# Patient Record
Sex: FEMALE | Race: WHITE
[De-identification: names, ages, dates, MRNs, and addresses within clinical notes are randomized per-mention and may not be internally consistent; named-entity substitution may affect disease eponyms.]

---

## 2017-09-01 NOTE — EDM.PDOC
ED HPI GENERAL MEDICAL PROBLEM





- General


Chief Complaint: Syncope


Stated Complaint: DIZZINESS


Time Seen by Provider: 09/01/17 14:07


Source of Information: Reports: Patient


History Limitations: Reports: No Limitations





- History of Present Illness


INITIAL COMMENTS - FREE TEXT/NARRATIVE: 





Patient describes dizziness and near syncope for several weeks.  She has been 

getting treatment from her chiropractic doctor who did finally say that this 

was not neck related and she should be seen for this.  She states over the last 

1-2 weeks it has become worse.  No actual syncopal episodes, describes it as 

dizziness.  Does have nausea.  No vomiting.  No other complaints.  No history 

of MI, CVA.  History of HTN, Diabetes.  Is an occasional smoker.  States she can

't smoke at home, so will sometimes when out.  Drinks alcohol rarely, no drug 

use.


Onset: Gradual


Duration: Getting Worse


Location: Reports: Head


Worsens with: Reports: Movement


Associated Symptoms: Reports: Nausea/Vomiting





- Related Data


 Allergies











Allergy/AdvReac Type Severity Reaction Status Date / Time


 


erythromycin base Allergy  Respiratory Verified 09/01/17 14:21





   Distress  


 


Penicillins Allergy  Respiratory Verified 09/01/17 14:21





   Distress  











Home Meds: 


 Home Meds





Insulin Glarg,Human.Rec.Analog [LantUS] 15 unit SQ BID 03/17/15 [History]


metFORMIN [Glucophage] 1,000 mg PO BID 03/17/15 [History]











Social & Family History





- Tobacco Use


Smoking Status *Q: Current Every Day Smoker


Years of Tobacco use: 45





ED ROS GENERAL





- Review of Systems


Review Of Systems: See Below


Constitutional: Reports: No Symptoms


HEENT: Reports: No Symptoms


Respiratory: Reports: No Symptoms


Cardiovascular: Reports: No Symptoms


Endocrine: Reports: No Symptoms


GI/Abdominal: Reports: No Symptoms


: Reports: No Symptoms


Musculoskeletal: Reports: No Symptoms


Skin: Reports: No Symptoms


Neurological: Reports: Dizziness


Psychiatric: Reports: No Symptoms


Hematologic/Lymphatic: Reports: No Symptoms


Immunologic: Reports: No Symptoms





- Physical Exam


Exam: See Below


Exam Limited By: No Limitations


General Appearance: Alert, WD/WN, Mild Distress


Eye Exam: Bilateral Eye: EOMI, PERRL


Ears: Normal TMs


Throat/Mouth: Normal Inspection, Normal Oropharynx


Head Exam: Atraumatic, Normocephalic


Neck: Normal Inspection, Supple, Non-Tender, Full Range of Motion


Respiratory/Chest: No Respiratory Distress, Lungs Clear, Normal Breath Sounds, 

No Accessory Muscle Use, Chest Non-Tender


Cardiovascular: Normal Peripheral Pulses, Regular Rate, Rhythm, No Edema


GI/Abdominal: Normal Bowel Sounds, Soft, Non-Tender, No Organomegaly


Neuro Exam (Abbreviated): Alert, Oriented, CN II-XII Intact, Normal Cognition, 

Normal Gait, Normal Reflexes, No Motor/Sensory Deficits


Back Exam: Normal Inspection, Full Range of Motion


Extremities: Normal Inspection, Normal Range of Motion, Non-Tender, No Pedal 

Edema, Normal Capillary Refill


Psychiatric: Normal Affect, Normal Mood


Skin Exam: Warm, Dry, Intact, Normal Color, No Rash





Course





- Vital Signs


Last Recorded V/S: 


 Last Vital Signs











Temp  36.7 C   09/01/17 14:07


 


Pulse  87   09/01/17 14:07


 


Resp  16   09/01/17 14:07


 


BP  164/75 H  09/01/17 14:07


 


Pulse Ox  95   09/01/17 14:07














- Orders/Labs/Meds


Orders: 


 Active Orders 24 hr











 Category Date Time Status


 


 EKG Documentation Completion [RC] ROUTINE Care  09/01/17 14:16 Active


 


 Chest 2V [CR] Stat Exams  09/01/17 14:16 Taken


 


 Head wo Cont [CT] Stat Exams  09/01/17 14:16 Taken


 


 Saline Lock Insert [OM.PC] Routine Oth  09/01/17 14:16 Ordered











Labs: 


 Laboratory Tests











  09/01/17 09/01/17 09/01/17 Range/Units





  14:29 14:29 14:29 


 


WBC  7.4    (4.0-10.0)  x10^3/uL


 


RBC  4.50    (4.00-5.50)  x10^6/uL


 


Hgb  14.1    (12.0-16.0)  g/dL


 


Hct  41.6    (33.0-47.0)  %


 


MCV  92.4    (78.0-93.0)  fL


 


MCH  31.3    (26.0-32.0)  pg


 


MCHC  33.9    (32.0-36.0)  g/dL


 


RDW Coeff of [place]  12.9    (10.0-15.0)  %


 


Plt Count  222    (130-400)  x10^3/uL


 


Neut % (Auto)  58.4    (50.0-80.0)  %


 


Lymph % (Auto)  31.9    (25.0-50.0)  %


 


Mono % (Auto)  8.2    (2.0-11.0)  %


 


Eos % (Auto)  1.5    (0.0-4.0)  %


 


Baso % (Auto)  0.0 L    (0.2-1.2)  %


 


PT   9.8   (9.8-11.8)  SEC


 


INR   0.9 L   (2.0-3.5)  


 


Sodium    142  (136-145)  mmol/L


 


Potassium    4.4  (3.5-5.1)  mmol/L


 


Chloride    107  ()  mmol/L


 


Carbon Dioxide    25  (21-32)  mmol/L


 


BUN    15  (7-18)  mg/dL


 


Creatinine    1.3 H  (0.55-1.02)  mg/dL


 


Est Cr Clr Drug Dosing    35.76  mL/min


 


Estimated GFR (MDRD)    41  


 


Glucose    168 H  ()  mg/dL


 


Lactic Acid     (0.4-2.0)  mmol/L


 


Calcium    8.5  (8.5-10.1)  mg/dL


 


Corrected Calcium    8.98  (8.5-10.1)  mg/dL


 


Total Bilirubin    0.4  (0.2-1.0)  mg/dL


 


AST    17  (15-37)  U/L


 


ALT    27  (14-59)  U/L


 


Alkaline Phosphatase    75  ()  U/L


 


Creatine Kinase    68  ()  U/L


 


Creatine Kinase Index    TNP  


 


CK-MB (CK-2)    TNP  


 


Troponin I    < 0.017  (<=0.056)  ng/mL


 


C-Reactive Protein    < 0.2  (<=0.9)  mg/dL


 


Total Protein    6.6  (6.4-8.2)  g/dL


 


Albumin    3.4  (3.4-5.0)  g/dL


 


Globulin    3.2  


 


Albumin/Globulin Ratio    1.06  


 


TSH, Ultra Sensitive    2.619  (0.358-3.74)  uIU/mL


 


Urine Color     (YELLOW)  


 


Urine Appearance     (CLEAR)  


 


Urine pH     (5.0-8.0)  


 


Ur Specific Gravity     


 


Urine Protein     (NEGATIVE)  mg/dL


 


Urine Glucose (UA)     (NEGATIVE)  mg/dL


 


Urine Ketones     (NEGATIVE)  mg/dL


 


Urine Occult Blood     (NEGATIVE)  


 


Urine Nitrite     (NEGATIVE)  


 


Urine Bilirubin     (NEGATIVE)  


 


Urine Urobilinogen     (0.2)  EU/dL


 


Ur Leukocyte Esterase     (NEGATIVE)  


 


Urine RBC     (NOT SEEN)  /HPF


 


Urine WBC     (NOT SEEN)  /HPF


 


Ur Squamous Epith Cells     (NEGATIVE)  /HPF


 


Urine Bacteria     (NEGATIVE)  /HPF


 


Urine Mucus     (NEGATIVE)  /LPF














  09/01/17 09/01/17 Range/Units





  14:29 15:46 


 


WBC    (4.0-10.0)  x10^3/uL


 


RBC    (4.00-5.50)  x10^6/uL


 


Hgb    (12.0-16.0)  g/dL


 


Hct    (33.0-47.0)  %


 


MCV    (78.0-93.0)  fL


 


MCH    (26.0-32.0)  pg


 


MCHC    (32.0-36.0)  g/dL


 


RDW Coeff of [place]    (10.0-15.0)  %


 


Plt Count    (130-400)  x10^3/uL


 


Neut % (Auto)    (50.0-80.0)  %


 


Lymph % (Auto)    (25.0-50.0)  %


 


Mono % (Auto)    (2.0-11.0)  %


 


Eos % (Auto)    (0.0-4.0)  %


 


Baso % (Auto)    (0.2-1.2)  %


 


PT    (9.8-11.8)  SEC


 


INR    (2.0-3.5)  


 


Sodium    (136-145)  mmol/L


 


Potassium    (3.5-5.1)  mmol/L


 


Chloride    ()  mmol/L


 


Carbon Dioxide    (21-32)  mmol/L


 


BUN    (7-18)  mg/dL


 


Creatinine    (0.55-1.02)  mg/dL


 


Est Cr Clr Drug Dosing    mL/min


 


Estimated GFR (MDRD)    


 


Glucose    ()  mg/dL


 


Lactic Acid  2.6 H   (0.4-2.0)  mmol/L


 


Calcium    (8.5-10.1)  mg/dL


 


Corrected Calcium    (8.5-10.1)  mg/dL


 


Total Bilirubin    (0.2-1.0)  mg/dL


 


AST    (15-37)  U/L


 


ALT    (14-59)  U/L


 


Alkaline Phosphatase    ()  U/L


 


Creatine Kinase    ()  U/L


 


Creatine Kinase Index    


 


CK-MB (CK-2)    


 


Troponin I    (<=0.056)  ng/mL


 


C-Reactive Protein    (<=0.9)  mg/dL


 


Total Protein    (6.4-8.2)  g/dL


 


Albumin    (3.4-5.0)  g/dL


 


Globulin    


 


Albumin/Globulin Ratio    


 


TSH, Ultra Sensitive    (0.358-3.74)  uIU/mL


 


Urine Color   Yellow  (YELLOW)  


 


Urine Appearance   Slightly cloudy H  (CLEAR)  


 


Urine pH   6.0  (5.0-8.0)  


 


Ur Specific Gravity   1.015  


 


Urine Protein   Negative  (NEGATIVE)  mg/dL


 


Urine Glucose (UA)   Negative  (NEGATIVE)  mg/dL


 


Urine Ketones   Negative  (NEGATIVE)  mg/dL


 


Urine Occult Blood   Negative  (NEGATIVE)  


 


Urine Nitrite   Negative  (NEGATIVE)  


 


Urine Bilirubin   Negative  (NEGATIVE)  


 


Urine Urobilinogen   0.2  (0.2)  EU/dL


 


Ur Leukocyte Esterase   Negative  (NEGATIVE)  


 


Urine RBC   0-5  (NOT SEEN)  /HPF


 


Urine WBC   0-5  (NOT SEEN)  /HPF


 


Ur Squamous Epith Cells   Moderate H  (NEGATIVE)  /HPF


 


Urine Bacteria   Not seen  (NEGATIVE)  /HPF


 


Urine Mucus   Not seen  (NEGATIVE)  /LPF











Meds: 


Medications














Discontinued Medications














Generic Name Dose Route Start Last Admin





  Trade Name Freq  PRN Reason Stop Dose Admin


 


Sodium Chloride  1,000 mls @ 999 mls/hr  09/01/17 14:16  09/01/17 14:55





  Normal Saline  IV  09/01/17 15:16  999 mls/hr





  ONETIME ONE   Administration


 


Meclizine HCl  25 mg  09/01/17 15:46  09/01/17 15:50





  Antivert  PO  09/01/17 15:47  25 mg





  ONETIME ONE   Administration


 


Sodium Chloride  10 ml  09/01/17 14:16  





  Saline Flush  FLUSH   





  ASDIRECTED PRN   





  Keep Vein Open   














- Radiology Interpretation


Free Text/Narrative:: 


Head CT and Chest X-Ray reviewed.  No acute process identified.





- Re-Assessments/Exams


Free Text/Narrative Re-Assessment/Exam: 





09/01/17 15:03


Epley maneuver performed with no signs of worsening or betterment of symptoms





Departure





- Departure


Time of Disposition: 16:20


Disposition: Home, Self-Care 01


Clinical Impression: 


 Dizziness








- Discharge Information


Instructions:  Vertigo, Easy-to-Read


Referrals: 


Jackie Rubio DO [Primary Care Provider] - 


Forms:  ED Department Discharge


Additional Instructions: 


Your testing here today was all normal.  





Head CT did not show any acute cause for your dizziness, nor did the chest x-

ray.





Your cardiac labs and EKG were normal as well.





I did prescribe Meclizine, also called antivert for your dizziness.  This can 

be taken over the counter at the pharmacy.  This may elevate your blood 

pressure so you should follow up with Dr. Rubio next week.  If this medication 

does not improve your dizziness, make sure to stop taking it.  





Stay well hydrated.





I would also discuss with her if she thinks you should have a cardiologist 

visit.





Please call with any questions or concerns.





- My Orders


Last 24 Hours: 


My Active Orders





09/01/17 14:16


EKG Documentation Completion [RC] ROUTINE 


Chest 2V [CR] Stat 


Head wo Cont [CT] Stat 


Saline Lock Insert [OM.PC] Routine 














- Assessment/Plan


Last 24 Hours: 


My Active Orders





09/01/17 14:16


EKG Documentation Completion [RC] ROUTINE 


Chest 2V [CR] Stat 


Head wo Cont [CT] Stat 


Saline Lock Insert [OM.PC] Routine

## 2019-03-13 NOTE — EDM.PDOC
ED HPI GENERAL MEDICAL PROBLEM





- General


Chief Complaint: Upper Extremity Injury/Pain


Stated Complaint: FELL ON ARM


Time Seen by Provider: 03/13/19 10:44


Source of Information: Reports: Patient


History Limitations: Reports: No Limitations





- History of Present Illness


INITIAL COMMENTS - FREE TEXT/NARRATIVE: 





Patient slipped on the ice this morning and states she broke her left arm.  She 

has no other injuries and denies LOC or hitting her head.  Denies all other 

medical complaints at this time.  No chest pain, no numbness or tingling to arm 

or fingers.  No nausea or vomiting.


Onset: Today, Sudden


Duration: Intermittent


Location: Reports: Upper Extremity, Left


Quality: Reports: Ache


Severity: Moderate


  ** Left Upper Arm


Pain Score (Numeric/FACES): 10





- Related Data


 Allergies











Allergy/AdvReac Type Severity Reaction Status Date / Time


 


erythromycin base Allergy  Respiratory Verified 03/13/19 10:45





   Distress  


 


Penicillins Allergy  Respiratory Verified 03/13/19 10:45





   Distress  











Home Meds: 


 Home Meds





Insulin Glarg,Human.Rec.Analog [LantUS] 15 unit SQ BID 03/17/15 [History]


metFORMIN [Glucophage] 1,000 mg PO BID 03/17/15 [History]


Ibuprofen [Advil] 400 mg PO Q6H PRN 01/04/19 [History]











Past Medical History


Cardiovascular History: Reports: Hypertension


Gastrointestinal History: Reports: None


Genitourinary History: Reports: None


Musculoskeletal History: Reports: Back Pain, Chronic


Neurological History: Reports: Neuropathy, Diabetic


Psychiatric History: Reports: Depression


Endocrine/Metabolic History: Reports: Diabetes, Type II





- Past Surgical History


Head Surgeries/Procedures: Reports: None


Female  Surgical History: Reports: Hysterectomy





Social & Family History





- Family History


HEENT: Reports: Cataract


Other HEENT Family History: Parents


Cardiac: Reports: Heart Failure


Other Cardiac Family History: Father


Respiratory: Reports: None


GI: Reports: Chronic Constipation


OBGYN: Reports: None, Other (See Below)


Other OBGYN Family History: Unknown


Musculoskeletal: Reports: Back pain, Chronic


Neurological: Reports: Neuropathy, Peripheral


Psychiatric: Reports: Anxiety, Depression


Endocrine/Metabolic: Reports: Diabetes, Type I, Diabetes, type II


Hematologic: Reports: None


Oncologic: Reports: Breast


Other Oncologic Family History: Sister





- Tobacco Use


Smoking Status *Q: Unknown Ever Smoked





- Living Situation & Occupation


Living situation: Reports: , Alone


Occupation: Retired (teacher. Good social network.)





Review of Systems





- Review of Systems


Review Of Systems: See Below


Constitutional: Reports: No Symptoms


Eyes: Reports: No Symptoms


Ears: Reports: No Symptoms


Nose: Reports: No Symptoms


Mouth/Throat: Reports: No Symptoms


Respiratory: Reports: No Symptoms


Cardiovascular: Reports: No Symptoms


GI/Abdominal: Reports: No Symptoms


Genitourinary: Reports: No Symptoms


Musculoskeletal: Reports: Shoulder Pain (left), Arm Pain


Skin: Reports: Bruising


Neurological: Reports: No Symptoms


Psychiatric: Reports: No Symptoms





ED EXAM, GENERAL





- Physical Exam


Exam: See Below


Exam Limited By: No Limitations


General Appearance: Alert, WD/WN, Mild Distress


Eye Exam: Bilateral Eye: EOMI, Normal Inspection


Nose: Normal Inspection, Normal Mucosa, No Blood


Throat/Mouth: Normal Inspection, Normal Lips, Normal Teeth, Normal Gums, Normal 

Oropharynx, Normal Voice, No Airway Compromise


Head: Atraumatic, Normocephalic


Neck: Normal Inspection, Supple, Non-Tender, Full Range of Motion


Respiratory/Chest: No Respiratory Distress, Lungs Clear, Normal Breath Sounds, 

No Accessory Muscle Use, Chest Non-Tender


Cardiovascular: Normal Peripheral Pulses, Regular Rate, Rhythm, No Edema, No 

Gallop, No JVD, No Murmur, No Rub


Peripheral Pulses: 2+: Brachial (L), Radial (L)


Extremities: Normal Capillary Refill, Joint Swelling, Limited Range of Motion, 

Other (left proximal arm has significant edema with ecchymosis)


Neurological: Alert, Oriented, CN II-XII Intact, Normal Cognition, Normal Gait, 

Normal Reflexes, No Motor/Sensory Deficits


Psychiatric: Normal Affect, Anxious


Skin Exam: Ecchymosis


Lymphatic: No Adenopathy





ED TRAUMA EXTREMITY PROCEDURES





- Splinting


  ** Left Upper Extremity


Splint Site: left arm


Pre-Procedure NV Status: Normal


Post-Procedure NV Status: Normal


Splint Material: Fiberglass


Splint Design: Sling & Swathe


Applied & Form Fitted By: Provider, Nurse


Provider Post-Splint Application NV Check: NV Status Normal, Good Position


Complications: No


Progress/Comments: 





coaptation sling applied from axilla, down and around the elbow up to the base 

of the neck.  Padding applied before fiberglass splint, ace wrapped after 

splint was formed, sling made from tube gauze around wrist and neck.  No 

pressure applied to elbow.  Tolerated without difficulty.  No plaster used due 

to extreme swelling to area and potential for more.





Course





- Vital Signs


Last Recorded V/S: 


 Last Vital Signs











Temp  35.8 C   03/13/19 10:35


 


Pulse  95   03/13/19 10:35


 


Resp  18   03/13/19 10:35


 


BP  140/77   03/13/19 10:35


 


Pulse Ox  96   03/13/19 10:35














- Orders/Labs/Meds


Meds: 


Medications














Discontinued Medications














Generic Name Dose Route Start Last Admin





  Trade Name Radha  PRN Reason Stop Dose Admin


 


Ondansetron HCl  4 mg  03/13/19 10:52  03/13/19 11:00





  Zofran Odt  PO  03/13/19 10:53  4 mg





  ONETIME ONE   Administration





     





     





     





     


 


Tramadol HCl  50 mg  03/13/19 10:52  03/13/19 11:00





  Ultram  PO  03/13/19 10:53  50 mg





  ONETIME ONE   Administration





     





     





     





     














Departure





- Departure


Time of Disposition: 13:58


Disposition: Home, Self-Care 01


Condition: Fair


Clinical Impression: 


 Fracture of humerus, left, closed








- Discharge Information


*PRESCRIPTION DRUG MONITORING PROGRAM REVIEWED*: Not Applicable


*COPY OF PRESCRIPTION DRUG MONITORING REPORT IN PATIENT KIKA: Not Applicable


Instructions:  Cast or Splint Care, Adult, Easy-to-Read, Humerus Fracture 

Treated With Immobilization, Easy-to-Read


Referrals: 


Neetu Thrasher NP [Primary Care Provider] - 


Forms:  ED Department Discharge


Additional Instructions: 


Plan





1. Follow up with [facility] sometime next week.  Call the main number [phone] and ask to make an appointment with orthopedic clinic.  Address is [address] [place] [address]





2. Images have been sent to [facility] imaging so they do have them to look at.





3. Rest your arm as much as you can.  You should also apply ice to reduce 

swelling.





4. If you develop sudden uncontrolled pain, severe numbness, tingling, or 

swelling to your hand or fingertips, call or come back immediately.





5. Take you Tramadol every 6 hours as needed for pain.





6. Leave your arm bent at 90 degrees and keep it in the tie to support your arm.





7. Call if you have any questions or concerns.





ED Communication





- ED Communication Date/Time


Date: 03/13/19


Time Called: 12:00





- Discussed Case With (1)


Discussed Case With (1): Other (Dr. Kinney with [facility] orthopedics was 

consulted and orders to wrap in a coaptation splint and to take additional x-

ray images received and performed.)





- Problem List & Annotations


(1) Fracture of humerus, left, closed


SNOMED Code(s): 99248141


   Code(s): S42.302A - UNSP FRACTURE OF SHAFT OF HUMERUS, LEFT ARM, INIT   

Status: Acute   Priority: Medium   Current Visit: No   


Qualifiers: 


   Encounter type: initial encounter   Humerus Location: proximal   Fracture 

alignment: displaced 





- Problem List Review


Problem List Initiated/Reviewed/Updated: Yes





- Assessment/Plan


Assessment:: 





left displaced proximal humerus


Plan: 


Plan





1. Follow up with [facility] sometime next week.  Call the main number [phone] and ask to make an appointment with orthopedic clinic.  Address is [address] [place] [address]





2. Images have been sent to [facility] imaging so they do have them to look at.





3. Rest your arm as much as you can.  You should also apply ice to reduce 

swelling.





4. If you develop sudden uncontrolled pain, severe numbness, tingling, or 

swelling to your hand or fingertips, call or come back immediately.





5. Take you Tramadol every 6 hours as needed for pain.





6. Leave your arm bent at 90 degrees and keep it in the tie to support your arm.





7. Call if you have any questions or concerns.

## 2019-03-13 NOTE — CR
______________________________________________________________________________   

  

[id number] RAD/RAD Humerus Left 2V  

EXAM: 3 VIEWS LEFT HUMERUS.  

   

 INDICATION: FALL.  

   

 COMPARISON: None.  

   

 DISCUSSION: There is a multi fragmentary displaced fracture involving the  

 proximal to mid left humerus. This is better described on recent CT.  

   

 IMPRESSION:  

 1.  Multi fragmentary displaced fracture involving the proximal left humerus.  

   

 Electronically signed by Chucky Flores MD on 3/13/2019 12:42 PM  

   

  

Chucky Flores DO                 

 03/13/19 1245    

  

Thank you for allowing us to participate in the care of your patient.

## 2019-03-13 NOTE — CT
______________________________________________________________________________   

  

[id number] CT/CT Shoulder Left WO IV  

Exam:   

   

 CT Shoulder Left WO IV  

   

 Clinical Data:   

   

 TRAUMA  

   

 COMPARISON:   

   

 NO PREVIOUS SIMILAR EXAM IS AVAILABLE  

   

 FINDINGS:   

   

 A comminuted diastatic angulated proximal left humeral diaphyseal fracture is  

 seen.  

   

 There is no scapular fracture.  

   

 Reformatted images suggest fractures of the scapula and sternum, thought   

   

 to be related to reformation artifact..   

   

 IMPRESSION:  

   

 DIASTATIC ANGULATED PROXIMAL LEFT HUMERAL DIAPHYSEAL FRACTURE.  

   

 Electronically signed by Calixto Carmen MD on 3/13/2019 12:23 PM  

   

  

Calixto Carmen MD                 

 03/13/19 [id number]    

  

Thank you for allowing us to participate in the care of your patient.

## 2019-03-15 NOTE — EDM.PDOC
ED HPI GENERAL MEDICAL PROBLEM





- General


Chief Complaint: Upper Extremity Injury/Pain


Time Seen by Provider: 03/15/19 10:20


Source of Information: Reports: Patient, Old Records


History Limitations: Reports: No Limitations





- History of Present Illness


INITIAL COMMENTS - FREE TEXT/NARRATIVE: 





Pt. fell as fractured her L humerus on 3/13/19. A sugar tongs splint and sling 

was placed around neck using tube gauze. Pt. complains of continued discomfort 

in L upper arm. She states that the splint does not fit anymore because the 

swelling has decreased. She states that the tramadol is making her nauseated. 

No numbness/tingling in the distal portion of the extremity. She states that 

she can't make it in to the ortho clinic until her appointment next Wednesday.


Onset: Today


Onset Date: 03/15/19


Location: Reports: Upper Extremity, Left


Quality: Reports: Throbbing


Severity: Severe





- Related Data


 Allergies











Allergy/AdvReac Type Severity Reaction Status Date / Time


 


erythromycin base Allergy  Respiratory Verified 03/15/19 11:03





   Distress  


 


Penicillins Allergy  Respiratory Verified 03/15/19 11:03





   Distress  











Home Meds: 


 Home Meds





Insulin Glarg,Human.Rec.Analog [LantUS] 15 unit SQ BID 03/17/15 [History]


metFORMIN [Glucophage] 1,000 mg PO BID 03/17/15 [History]


Ibuprofen [Advil] 400 mg PO Q6H PRN 01/04/19 [History]











Past Medical History


Cardiovascular History: Reports: Hypertension


Gastrointestinal History: Reports: None


Genitourinary History: Reports: None


Musculoskeletal History: Reports: Back Pain, Chronic


Neurological History: Reports: Neuropathy, Diabetic


Psychiatric History: Reports: Depression


Endocrine/Metabolic History: Reports: Diabetes, Type II





- Past Surgical History


Head Surgeries/Procedures: Reports: None


Female  Surgical History: Reports: Hysterectomy





Social & Family History





- Family History


HEENT: Reports: Cataract


Other HEENT Family History: Parents


Cardiac: Reports: Heart Failure


Other Cardiac Family History: Father


Respiratory: Reports: None


GI: Reports: Chronic Constipation


OBGYN: Reports: None, Other (See Below)


Other OBGYN Family History: Unknown


Musculoskeletal: Reports: Back pain, Chronic


Neurological: Reports: Neuropathy, Peripheral


Psychiatric: Reports: Anxiety, Depression


Endocrine/Metabolic: Reports: Diabetes, Type I, Diabetes, type II


Hematologic: Reports: None


Oncologic: Reports: Breast


Other Oncologic Family History: Sister





- Living Situation & Occupation


Living situation: Reports: , Alone


Occupation: Retired (teacher. Good social network.)





Review of Systems





- Review of Systems


Review Of Systems: See Below


Musculoskeletal: Reports: Arm Pain





ED EXAM, GENERAL





- Physical Exam


Exam: See Below


General Appearance: Alert, WD/WN, No Apparent Distress


Extremities: Other (ecchymosis and edema noted to L upper arm. CMS intact. No 

deformity or crepitus noted.)





Course





- Orders/Labs/Meds


Meds: 





Medications














Discontinued Medications














Generic Name Dose Route Start Last Admin





  Trade Name Freq  PRN Reason Stop Dose Admin


 


Ondansetron HCl  4 mg  03/15/19 10:54  03/15/19 10:58





  Zofran Odt  PO  03/15/19 10:55  4 mg





  ONETIME ONE   Administration





     





     





     





     














- Re-Assessments/Exams


Free Text/Narrative Re-Assessment/Exam: 


fiberglas sugar tongs splint placed on L upper arm. The arm was placed on a 

shoulder immobilizer. Pt. reported that it was much more comfortable and was 

tolerating it well.





Departure





- Departure


Time of Disposition: 12:45


Disposition: Home, Self-Care 01


Condition: Good


Clinical Impression: 


 Fracture of humerus








- Discharge Information


Instructions:  Cast or Splint Care, Adult, Easy-to-Read


Referrals: 


Neetu Thrasher NP [Primary Care Provider] - 


Forms:  ED Department Discharge


Additional Instructions: 


I am prescribing you some Norco. Sometimes people tolerate this better than 

tramadol. If you choose to take it, take one tab every 4-6 hours as needed for 

pain.





Zofran 4mg ODT 1 every 8 hours for nausea.





They are unable to get you a sooner appointment, but they have an ortho walk in 

clinic open at [facility] on [address] if you choose to go there. The walk-in 

clinic is open from 1:30-4PM.





- Assessment/Plan


Plan: 





I am prescribing you some Norco. Sometimes people tolerate this better than 

tramadol. If you choose to take it, take one tab every 4-6 hours as needed for 

pain.





Zofran 4mg ODT 1 every 8 hours for nausea.





They are unable to get you a sooner appointment, but they have an ortho walk in 

clinic open at [facility] on [address] if you choose to go there. The walk-in 

clinic is open from 1:30-4PM.

## 2022-11-26 ENCOUNTER — HOSPITAL ENCOUNTER (EMERGENCY)
Dept: HOSPITAL 50 - VM.ED | Age: 72
Discharge: HOME | End: 2022-11-26
Payer: MEDICARE

## 2022-11-26 VITALS — HEART RATE: 95 BPM | DIASTOLIC BLOOD PRESSURE: 74 MMHG | SYSTOLIC BLOOD PRESSURE: 142 MMHG

## 2022-11-26 DIAGNOSIS — Z88.1: ICD-10-CM

## 2022-11-26 DIAGNOSIS — Z88.0: ICD-10-CM

## 2022-11-26 DIAGNOSIS — Z87.891: ICD-10-CM

## 2022-11-26 DIAGNOSIS — E11.9: ICD-10-CM

## 2022-11-26 DIAGNOSIS — Z79.82: ICD-10-CM

## 2022-11-26 DIAGNOSIS — Z79.4: ICD-10-CM

## 2022-11-26 DIAGNOSIS — I10: ICD-10-CM

## 2022-11-26 DIAGNOSIS — J20.9: Primary | ICD-10-CM

## 2022-11-26 DIAGNOSIS — Z20.822: ICD-10-CM

## 2022-11-26 LAB
S PYO DNA THROAT QL NAA+PROBE: NOT DETECTED
SARS-COV-2 RNA RESP QL NAA+PROBE: NEGATIVE

## 2022-11-26 PROCEDURE — 0240U: CPT

## 2022-11-26 PROCEDURE — 87651 STREP A DNA AMP PROBE: CPT

## 2022-11-26 PROCEDURE — 99283 EMERGENCY DEPT VISIT LOW MDM: CPT
